# Patient Record
Sex: FEMALE | Race: ASIAN | Employment: FULL TIME | URBAN - METROPOLITAN AREA
[De-identification: names, ages, dates, MRNs, and addresses within clinical notes are randomized per-mention and may not be internally consistent; named-entity substitution may affect disease eponyms.]

---

## 2017-05-22 ENCOUNTER — HOSPITAL ENCOUNTER (OUTPATIENT)
Dept: MAMMOGRAPHY | Age: 62
Discharge: HOME | End: 2017-05-22
Attending: FAMILY MEDICINE
Payer: COMMERCIAL

## 2017-05-22 DIAGNOSIS — Z12.31 VISIT FOR SCREENING MAMMOGRAM: ICD-10-CM

## 2017-05-22 PROCEDURE — 77063 BREAST TOMOSYNTHESIS BI: CPT

## 2018-05-23 ENCOUNTER — HOSPITAL ENCOUNTER (OUTPATIENT)
Dept: MAMMOGRAPHY | Age: 63
Discharge: HOME | End: 2018-05-23
Attending: FAMILY MEDICINE
Payer: COMMERCIAL

## 2018-05-23 ENCOUNTER — HOSPITAL ENCOUNTER (OUTPATIENT)
Dept: GENERAL RADIOLOGY | Age: 63
Discharge: HOME | End: 2018-05-23
Attending: FAMILY MEDICINE
Payer: COMMERCIAL

## 2018-05-23 DIAGNOSIS — Z12.31 VISIT FOR SCREENING MAMMOGRAM: ICD-10-CM

## 2018-05-23 DIAGNOSIS — M81.0 OSTEOPOROSIS: ICD-10-CM

## 2018-05-23 PROCEDURE — 77080 DXA BONE DENSITY AXIAL: CPT

## 2018-05-23 PROCEDURE — 77063 BREAST TOMOSYNTHESIS BI: CPT

## 2019-06-04 ENCOUNTER — HOSPITAL ENCOUNTER (OUTPATIENT)
Dept: MAMMOGRAPHY | Age: 64
Discharge: HOME | End: 2019-06-04
Attending: OBSTETRICS & GYNECOLOGY
Payer: COMMERCIAL

## 2019-06-04 DIAGNOSIS — Z12.31 VISIT FOR SCREENING MAMMOGRAM: ICD-10-CM

## 2019-06-04 PROCEDURE — 77063 BREAST TOMOSYNTHESIS BI: CPT

## 2020-06-02 ENCOUNTER — HOSPITAL ENCOUNTER (OUTPATIENT)
Dept: GENERAL RADIOLOGY | Age: 65
Discharge: HOME | End: 2020-06-02
Attending: FAMILY MEDICINE
Payer: COMMERCIAL

## 2020-06-02 ENCOUNTER — HOSPITAL ENCOUNTER (OUTPATIENT)
Dept: MAMMOGRAPHY | Age: 65
Discharge: HOME | End: 2020-06-02
Attending: FAMILY MEDICINE
Payer: COMMERCIAL

## 2020-06-02 DIAGNOSIS — Z12.31 VISIT FOR SCREENING MAMMOGRAM: ICD-10-CM

## 2020-06-02 DIAGNOSIS — M81.0 SENILE OSTEOPOROSIS: ICD-10-CM

## 2020-06-02 PROCEDURE — 77080 DXA BONE DENSITY AXIAL: CPT

## 2020-06-02 PROCEDURE — 77063 BREAST TOMOSYNTHESIS BI: CPT

## 2021-06-04 ENCOUNTER — HOSPITAL ENCOUNTER (OUTPATIENT)
Dept: WOMENS IMAGING | Age: 66
Discharge: HOME | End: 2021-06-04
Payer: COMMERCIAL

## 2021-06-04 DIAGNOSIS — Z12.31 ENCOUNTER FOR SCREENING MAMMOGRAM FOR BREAST CANCER: ICD-10-CM

## 2021-06-04 PROCEDURE — 77063 BREAST TOMOSYNTHESIS BI: CPT

## 2022-05-23 ENCOUNTER — TRANSCRIBE ORDER (OUTPATIENT)
Dept: SCHEDULING | Age: 67
End: 2022-05-23

## 2022-05-23 DIAGNOSIS — M85.89 OTHER SPECIFIED DISORDERS OF BONE DENSITY AND STRUCTURE, MULTIPLE SITES: Primary | ICD-10-CM

## 2022-05-24 ENCOUNTER — TRANSCRIBE ORDER (OUTPATIENT)
Dept: SCHEDULING | Age: 67
End: 2022-05-24

## 2022-05-24 DIAGNOSIS — Z12.31 SCREENING MAMMOGRAM, ENCOUNTER FOR: Primary | ICD-10-CM

## 2022-07-14 ENCOUNTER — HOSPITAL ENCOUNTER (OUTPATIENT)
Dept: WOMENS IMAGING | Age: 67
Discharge: HOME | End: 2022-07-14
Attending: FAMILY MEDICINE
Payer: COMMERCIAL

## 2022-07-14 ENCOUNTER — HOSPITAL ENCOUNTER (OUTPATIENT)
Dept: BONE DENSITY | Age: 67
Discharge: HOME | End: 2022-07-14
Attending: FAMILY MEDICINE
Payer: COMMERCIAL

## 2022-07-14 DIAGNOSIS — M85.89 OTHER SPECIFIED DISORDERS OF BONE DENSITY AND STRUCTURE, MULTIPLE SITES: ICD-10-CM

## 2022-07-14 DIAGNOSIS — Z12.31 SCREENING MAMMOGRAM, ENCOUNTER FOR: ICD-10-CM

## 2022-07-14 PROCEDURE — 77080 DXA BONE DENSITY AXIAL: CPT

## 2022-07-14 PROCEDURE — 77063 BREAST TOMOSYNTHESIS BI: CPT

## 2024-06-10 ENCOUNTER — HOSPITAL ENCOUNTER (OUTPATIENT)
Facility: HOSPITAL | Age: 69
Setting detail: RECURRING SERIES
Discharge: HOME | End: 2024-06-13
Payer: MEDICARE

## 2024-06-10 PROCEDURE — 97112 NEUROMUSCULAR REEDUCATION: CPT

## 2024-06-10 PROCEDURE — 97161 PT EVAL LOW COMPLEX 20 MIN: CPT

## 2024-06-10 PROCEDURE — 97535 SELF CARE MNGMENT TRAINING: CPT

## 2024-06-10 NOTE — PROGRESS NOTES
JOHNATHAN NGUYEN [facility]  [address] [address] Phone: [phone] Fax   Plan of Care / Statement of Necessity for Physical Therapy Services     Patient Name: Steffany Payne : 1955   Medical   Diagnosis: Right knee pain [M25.561]  Left knee pain [M25.562] Treatment Diagnosis: Right knee pain [M25.561]  Left knee pain [M25.562]   Onset Date: 2024 Payor: Payor: MEDICARE / Plan: MEDICARE PART A AND B / Product Type: *No Product type* /    Referral Source: Jennifer Ahumada APRN* Start of Care (SOC): 6/10/2024   Prior Hospitalization: See medical history Provider #: [id number]   Prior Level of Function: Independent, active   Comorbidities: HTN   Medications: Verified on Patient Summary List     Assessment / key information:  70 yo female who presents to [facility] with c/o B knee pain. Patient's impairments include + right medial and lateral joint pain, + tenderness at right pes anserine, + MCL laxity and strain. Patient reports decreased PLOF including hip, knee and ankle weakness and poor balance, hypermobility throughout, decreased stability throughout. Patient demonstrates decreased ROM, decreased strength, impaired posture, impaired gait mechancis, pain and decreased functional mobility tolerance.     Patient will continue to benefit from skilled PT services to modify and progress therapeutic interventions, address functional mobility deficits, address ROM deficits, address strength deficits, analyze and address soft tissue restrictions, analyze and cue movement patterns, analyze and modify body mechanics/ergonomics, assess and modify postural abnormalities, address imbalance/dizziness, and instruct in home and community integration to attain remaining goals.    Evaluation Complexity:  History:  LOW Complexity : Zero comorbidities / personal factors that will impact the outcome / POC; Examination:  LOW Complexity : 1-2 
be accomplished in 4 weeks  Patient will be independent and compliant with HEP to progress toward goals and restore functional mobility.   Eval Status: issued at eval    Pt will have 5/5 bilateral knee extension strength to return to goals of stairs without pain.  Eval Status: 4/5 bilateral knee extension    Long Term Goals: To be accomplished in 8 weeks  Patient will improve LEFS score by 10 points in order to improve pain with stairs   Current: 65/80    2.   Pt will have 5/5 hip extension strength to return to goals of bending/lifting without pain.  Eval Status: 4/5 hip extension     PLAN  [x]  Upgrade activities as tolerated     [x]  Continue plan of care  [x]  Update interventions per flow sheet       []  Discharge due to:_  []  Other:_      Brianna Strange, PT 6/10/2024  2:06 PM

## 2024-06-13 ENCOUNTER — HOSPITAL ENCOUNTER (OUTPATIENT)
Facility: HOSPITAL | Age: 69
Setting detail: RECURRING SERIES
Discharge: HOME | End: 2024-06-16
Payer: MEDICARE

## 2024-06-13 PROCEDURE — 97140 MANUAL THERAPY 1/> REGIONS: CPT

## 2024-06-13 PROCEDURE — 97530 THERAPEUTIC ACTIVITIES: CPT

## 2024-06-13 PROCEDURE — 97112 NEUROMUSCULAR REEDUCATION: CPT

## 2024-06-13 NOTE — PROGRESS NOTES
PHYSICAL / OCCUPATIONAL THERAPY - DAILY TREATMENT NOTE (updated )    Patient Name: Steffany Payne    Date: 2024    : 1955  Insurance: Payor: MEDICARE / Plan: MEDICARE PART A AND B / Product Type: *No Product type* /      Patient  verified Yes     Visit #   Current / Total 2 16   Time   In / Out 4 4:40   Pain   In / Out 2 0   Subjective Functional Status/Changes: Pt reports that the exercises are helping her feel better.      TREATMENT AREA =  Right knee pain [M25.561]  Left knee pain [M25.562]    OBJECTIVE         Therapeutic Procedures:    Tx Min Billable or 1:1 Min (if diff from Tx Min) Procedure, Rationale, Specifics   12  79158 Therapeutic Activity (timed):  use of dynamic activities replicating functional movements to increase ROM, strength, coordination, balance, and proprioception in order to improve patient's ability to progress to PLOF and address remaining functional goals.  (see flow sheet as applicable)     Details if applicable:       25  74105 Neuromuscular Re-Education (timed):  improve balance, coordination, kinesthetic sense, posture, core stability and proprioception to improve patient's ability to develop conscious control of individual muscles and awareness of position of extremities in order to progress to PLOF and address remaining functional goals. (see flow sheet as applicable)     Details if applicable:     8  97166 Manual Therapy (timed):  increase ROM, increase tissue extensibility, decrease trigger points, and improve alignment to improve patient's ability to progress to PLOF and address remaining functional goals.  The manual therapy interventions were performed at a separate and distinct time from the therapeutic activities interventions . (see flow sheet as applicable)     Details if applicable:  R peroneal STM, tibial IR MWM with VMP activation into knee ext          Details if applicable:            Details if applicable:     40  MC BC Totals Reminder: bill using

## 2024-06-18 ENCOUNTER — HOSPITAL ENCOUNTER (OUTPATIENT)
Facility: HOSPITAL | Age: 69
Setting detail: RECURRING SERIES
Discharge: HOME | End: 2024-06-21
Payer: MEDICARE

## 2024-06-18 PROCEDURE — 97530 THERAPEUTIC ACTIVITIES: CPT

## 2024-06-18 PROCEDURE — 97110 THERAPEUTIC EXERCISES: CPT

## 2024-06-18 PROCEDURE — 97112 NEUROMUSCULAR REEDUCATION: CPT

## 2024-06-18 NOTE — PROGRESS NOTES
PHYSICAL / OCCUPATIONAL THERAPY - DAILY TREATMENT NOTE (updated )    Patient Name: Steffany Payne    Date: 2024    : 1955  Insurance: Payor: MEDICARE / Plan: MEDICARE PART A AND B / Product Type: *No Product type* /      Patient  verified yes     Visit #   Current / Total 3 16   Time   In / Out 350 438   Pain   In / Out 2 0   Subjective Functional Status/Changes: On Saturday I did too much. I did some dancing and my      TREATMENT AREA =  Right knee pain [M25.561]  Left knee pain [M25.562]    Next PN/ RC due 7/10/24  Auth due med nec    OBJECTIVE    Therapeutic Procedures:  Tx Min Procedure, Rationale, Specifics   15 97298 Therapeutic Exercise (timed):  increase ROM, strength, coordination, balance, and proprioception to improve patient's ability to progress to PLOF and address remaining functional goals. (see flow sheet as applicable)     Details if applicable:       15 97423 Therapeutic Activity (timed):  use of dynamic activities replicating functional movements to increase ROM, strength, coordination, balance, and proprioception in order to improve patient's ability to progress to PLOF and address remaining functional goals.  (see flow sheet as applicable)     Details if applicable:     8 69932 Self Care/Home Management (timed):  improve patient knowledge and understanding of activity modification  to improve patient's ability to progress to PLOF and address remaining functional goals.  (see flow sheet as applicable)     Details if applicable:  Discussed and educated patient re: biomechanics in relation to recent activities done outside of the clinic vs her CC. Educated patient re: icing, elevation, and AROM to ankle     38 MC BC Totals Reminder: bill using total billable min of TIMED therapeutic procedures (example: do not include dry needle or estim unattended, both untimed codes, in totals to left)  8-22 min = 1 unit; 23-37 min = 2 units; 38-52 min = 3 units; 53-67 min = 4 units; 68-82 
Normal for race

## 2024-06-20 ENCOUNTER — HOSPITAL ENCOUNTER (OUTPATIENT)
Facility: HOSPITAL | Age: 69
Setting detail: RECURRING SERIES
Discharge: HOME | End: 2024-06-23
Payer: MEDICARE

## 2024-06-20 PROCEDURE — 97112 NEUROMUSCULAR REEDUCATION: CPT

## 2024-06-20 PROCEDURE — 97110 THERAPEUTIC EXERCISES: CPT

## 2024-06-20 PROCEDURE — 97530 THERAPEUTIC ACTIVITIES: CPT

## 2024-06-20 NOTE — PROGRESS NOTES
PHYSICAL / OCCUPATIONAL THERAPY - DAILY TREATMENT NOTE (updated )    Patient Name: Steffany Payne    Date: 2024    : 1955  Insurance: Payor: MEDICARE / Plan: MEDICARE PART A AND B / Product Type: *No Product type* /      Patient  verified yes     Visit #   Current / Total 4 16   Time   In / Out 4 4:50   Pain   In / Out 0 0   Subjective Functional Status/Changes: Pt reports she hurt her ankle dancing this weekend but it's slowly feeling better. She has no knee pain today.       TREATMENT AREA =  Right knee pain [M25.561]  Left knee pain [M25.562]    Next PN/ RC due 7/10/24  Auth due med nec    OBJECTIVE    Therapeutic Procedures:  Tx Min Procedure, Rationale, Specifics   15 09216 Therapeutic Exercise (timed):  increase ROM, strength, coordination, balance, and proprioception to improve patient's ability to progress to PLOF and address remaining functional goals. (see flow sheet as applicable)     Details if applicable:       15 54790 Neuromuscular Re-Education (timed):  improve balance, coordination, kinesthetic sense, posture, core stability and proprioception to improve patient's ability to develop conscious control of individual muscles and awareness of position of extremities in order to progress to PLOF and address remaining functional goals. (see flow sheet as applicable)      Details if applicable:     10 78488 Therapeutic Activity (timed):  use of dynamic activities replicating functional movements to increase ROM, strength, coordination, balance, and proprioception in order to improve patient's ability to progress to PLOF and address remaining functional goals.  (see flow sheet as applicable)      Details if applicable:       40 MC BC Totals Reminder: bill using total billable min of TIMED therapeutic procedures (example: do not include dry needle or estim unattended, both untimed codes, in totals to left)  8-22 min = 1 unit; 23-37 min = 2 units; 38-52 min = 3 units; 53-67 min = 4 units;

## 2024-06-25 ENCOUNTER — HOSPITAL ENCOUNTER (OUTPATIENT)
Facility: HOSPITAL | Age: 69
Setting detail: RECURRING SERIES
Discharge: HOME | End: 2024-06-28
Payer: MEDICARE

## 2024-06-25 PROCEDURE — 97530 THERAPEUTIC ACTIVITIES: CPT

## 2024-06-25 PROCEDURE — 97110 THERAPEUTIC EXERCISES: CPT

## 2024-06-25 PROCEDURE — 97112 NEUROMUSCULAR REEDUCATION: CPT

## 2024-06-27 ENCOUNTER — HOSPITAL ENCOUNTER (OUTPATIENT)
Facility: HOSPITAL | Age: 69
Setting detail: RECURRING SERIES
Discharge: HOME | End: 2024-06-30
Payer: MEDICARE

## 2024-06-27 PROCEDURE — 97164 PT RE-EVAL EST PLAN CARE: CPT

## 2024-06-27 PROCEDURE — 97530 THERAPEUTIC ACTIVITIES: CPT

## 2024-06-27 PROCEDURE — 97112 NEUROMUSCULAR REEDUCATION: CPT

## 2024-06-27 NOTE — PROGRESS NOTES
as tolerated  []  Discharge due to :  []  Other:    Justyna Carrillo PTA    6/27/2024    2:20 PM    Future Appointments   Date Time Provider Department Center   7/2/2024  4:00 PM Cintia Gu, PT [facility]   7/5/2024 10:00 AM Cintia Gu, PT [facility]   7/9/2024 10:00 AM Justyna Carrillo, AN[facility]   7/11/2024 10:00 AM Roxanna Cerda, PT [facility]   7/16/2024 10:00 AM Cintia Gu, PT [facility]   7/18/2024 10:00 AM Brianna Strange, PT [facility]

## 2024-07-02 ENCOUNTER — HOSPITAL ENCOUNTER (OUTPATIENT)
Facility: HOSPITAL | Age: 69
Setting detail: RECURRING SERIES
Discharge: HOME | End: 2024-07-05
Payer: MEDICARE

## 2024-07-02 PROCEDURE — 97530 THERAPEUTIC ACTIVITIES: CPT

## 2024-07-02 PROCEDURE — 97112 NEUROMUSCULAR REEDUCATION: CPT

## 2024-07-02 PROCEDURE — 97110 THERAPEUTIC EXERCISES: CPT

## 2024-07-02 NOTE — PROGRESS NOTES
remaining functional goals. (see flow sheet as applicable)     Details if applicable:  stand TKE, S/L clam   50  MC BC Totals Reminder: bill using total billable min of TIMED therapeutic procedures (example: do not include dry needle or estim unattended, both untimed codes, in totals to left)  8-22 min = 1 unit; 23-37 min = 2 units; 38-52 min = 3 units; 53-67 min = 4 units; 68-82 min = 5 units   Total Total     [x]  Patient Education billed concurrently with other procedures   [x] Review HEP    [] Progressed/Changed HEP, detail:    [] Other detail:       Objective Information/Functional Measures/Assessment    Trial of ambulation on treadmill but noted fairly significant lack of TKE on right LE with a lack of heel strike. Patient unable to correct with verbal cues; therefore, held ambulation on treadmill.    Noted anterior translation of B knees with sit to stand transfer; therefore, placed chair in front of knees and elevated mat height. Patient then able to perform minisquat without anterior translation.     Exercise progression per flowsheet.    Patient will continue to benefit from skilled PT / OT services to modify and progress therapeutic interventions, analyze and address functional mobility deficits, analyze and address ROM deficits, analyze and address strength deficits, analyze and address soft tissue restrictions, analyze and cue for proper movement patterns, analyze and modify for postural abnormalities, and instruct in home and community integration to address functional deficits and attain remaining goals.    Progress toward goals / Updated goals:  []  See Progress Note/Recertification    Short Term Goals: To be accomplished in 4 weeks  Patient will be independent and compliant with HEP to progress toward goals and restore functional mobility.   Eval Status: issued at [facility]  Current status: compliant with HEP     Pt will have 5/5 bilateral knee extension strength to return to goals of stairs without

## 2024-07-05 ENCOUNTER — HOSPITAL ENCOUNTER (OUTPATIENT)
Facility: HOSPITAL | Age: 69
Setting detail: RECURRING SERIES
Discharge: HOME | End: 2024-07-08
Payer: MEDICARE

## 2024-07-05 PROCEDURE — 97530 THERAPEUTIC ACTIVITIES: CPT

## 2024-07-05 PROCEDURE — 97112 NEUROMUSCULAR REEDUCATION: CPT

## 2024-07-05 PROCEDURE — 97110 THERAPEUTIC EXERCISES: CPT

## 2024-07-05 NOTE — PROGRESS NOTES
PHYSICAL THERAPY - DAILY TREATMENT NOTE (updated )    Patient Name: Steffany Payne    Date: 2024    : 1955  Insurance: Payor: MEDICARE / Plan: MEDICARE PART A AND B / Product Type: *No Product type* /      Patient  verified yes     Visit #   Current / Total 8 16   Time   In / Out 10:00 10:52   Pain   In / Out 0/10 B knees  2/10 left ankle 0/10   Subjective Functional Status/Changes: Reports she stepped on a slipper this morning and \"rolled my left ankle.\" States she fell on her knees. States she was able to get up. She reports pain in left ankle today at 210, no pain in B knees.     TREATMENT AREA =  Right knee pain [M25.561]  Left knee pain [M25.562]    Next PN/ RC due 7/10/24  Auth due FRANK  30V    OBJECTIVE    Ice (UNBILLED):  location/position: CP to left ankle in supported H/L     Min Rationale   10 decrease edema, decrease inflammation, and decrease pain to improve patient's ability to progress to PLOF and address remaining functional goals.     Skin assessment post-treatment:   Intact     Therapeutic Procedures:  Tx Min Billable or 1:1 Min (if diff from Tx Min) Procedure, Rationale, Specifics   19  27229 Therapeutic Exercise (timed):  increase ROM, strength, coordination, balance, and proprioception to improve patient's ability to progress to PLOF and address remaining functional goals. (see flow sheet as applicable)     Details if applicable:  NuStep, seated knee flexion, SLR, prone hip extension     8  41856 Therapeutic Activity (timed):  use of dynamic activities replicating functional movements to increase ROM, strength, coordination, balance, and proprioception in order to improve patient's ability to progress to PLOF and address remaining functional goals.  (see flow sheet as applicable)     Details if applicable:  bridge   15  44706 Neuromuscular Re-Education (timed):  improve balance, coordination, kinesthetic sense, posture, core stability and proprioception to improve patient's

## 2024-07-09 ENCOUNTER — HOSPITAL ENCOUNTER (OUTPATIENT)
Facility: HOSPITAL | Age: 69
Setting detail: RECURRING SERIES
Discharge: HOME | End: 2024-07-12
Payer: MEDICARE

## 2024-07-09 PROCEDURE — 97110 THERAPEUTIC EXERCISES: CPT

## 2024-07-09 PROCEDURE — 97112 NEUROMUSCULAR REEDUCATION: CPT

## 2024-07-09 PROCEDURE — 97530 THERAPEUTIC ACTIVITIES: CPT

## 2024-07-09 NOTE — PROGRESS NOTES
PHYSICAL  DAILY TREATMENT NOTE     Patient Name: Steffany Payne    Date: 2024    : 1955  Insurance: Payor: MEDICARE / Plan: MEDICARE PART A AND B / Product Type: *No Product type* /      Patient  verified Yes     Visit #   Current / Total 9 16   Time   In / Out 1002 1101   Pain   In / Out 2/10 R knee  4/10 L ankle 2/10   Subjective Functional Status/Changes: \"I start back to work . My ankle just been swelling some and hurting now sure what I did\"       Next PN/ RC due 7/10/24  Auth due FRANK  30V    TREATMENT AREA =  Right knee pain [M25.561]  Left knee pain [M25.562]    OBJECTIVE      Modalities Rationale:     decrease inflammation and decrease pain to improve patient's ability to progress to PLOF and address remaining functional goals.    10 min  unbill [x]  Ice     []  Heat    location/position: Supine with wedge under B Les     Skin assessment post-treatment:   Intact         Therapeutic Procedures:    Tx Min Billable or 1:1 Min (if diff from Tx Min) Procedure, Rationale, Specifics   29  32020 Therapeutic Exercise (timed):  increase ROM, strength, coordination, balance, and proprioception to improve patient's ability to progress to PLOF and address remaining functional goals. (see flow sheet as applicable)     Details if applicable:       10  85400 Neuromuscular Re-Education (timed):  improve balance, coordination, kinesthetic sense, posture, core stability and proprioception to improve patient's ability to develop conscious control of individual muscles and awareness of position of extremities in order to progress to PLOF and address remaining functional goals. (see flow sheet as applicable)     Details if applicable:     10  62742 Therapeutic Activity (timed):  use of dynamic activities replicating functional movements to increase ROM, strength, coordination, balance, and proprioception in order to improve patient's ability to progress to PLOF and address remaining functional goals.  (see

## 2024-07-11 ENCOUNTER — HOSPITAL ENCOUNTER (OUTPATIENT)
Facility: HOSPITAL | Age: 69
Setting detail: RECURRING SERIES
Discharge: HOME | End: 2024-07-14
Payer: MEDICARE

## 2024-07-11 PROCEDURE — 97110 THERAPEUTIC EXERCISES: CPT

## 2024-07-11 PROCEDURE — 97530 THERAPEUTIC ACTIVITIES: CPT

## 2024-07-11 PROCEDURE — 97112 NEUROMUSCULAR REEDUCATION: CPT

## 2024-07-11 NOTE — PROGRESS NOTES
[facility] AT [facility]  [address] [place] [address]   Phone: [phone] Fax: [phone]  PROGRESS NOTE  Patient Name: Steffany Payne : 1955   Treatment/Medical Diagnosis: M25.561  RIGHT KNEE PAIN and M25.562  LEFT KNEE PAIN      Referral Source: Jennifer Ahumada, APRN*     Date of Initial Visit: 6/10/2024 Attended Visits: 10 Missed Visits: 0     SUMMARY OF TREATMENT  Treatment has consisted of initial evaluation and 9 treatment sessions, which have included ROM/stretching/strengthening exercises, modalities for pain relief and patient education (including HEP).  CURRENT STATUS  Patient has progressed well with exercises in clinic and reports compliance with HEP.  Patient denies knee pain this session, but reports 2/10 left ankle pain, which she attributes to spraining left ankle on 2024 when she stepped on a slipper that was on her floor.    Strength (MMT):                                            Hip Left (1-5) Right (1-5)   Hip Flexion 4 4   Hip Extension 4- 3   Hip ABD 5 5   Hip ADD NT NT   Hip ER 4 4   Hip IR 5 5      Knee Left (1-5) Right (1-5)   Knee Flexion 5 5   Knee Extension 5 5   Ankle PF 4 4    Ankle DF  5 5    Other          LEFS: 52/30 (decreased from 65/80)  (Reviewed answers with patient as score has decreased from initial evaluation despite patient subjective reports of improvement; after reviewing answers, patient reports that she is more aware of her deficits and now realizes that many of her answers from initial LEFS were inaccurate)    Patient states goals as walking longer distances, increased ease negotiating stairs and increased ease with squatting.    Current Goals:  Short Term Goals: To be accomplished in 4 weeks  Patient will be independent and compliant with HEP to progress toward goals and restore functional mobility.   [facility] Status: issued at [facility]  Current Status: compliant with HEP, progressing     Pt 
(timed):  increase ROM, strength, coordination, balance, and proprioception to improve patient's ability to progress to PLOF and address remaining functional goals. (see flow sheet as applicable)     Details if applicable:  HR/TR, incline stretch, LAQ, prone knee flex, MMT     21  03569 Neuromuscular Re-Education (timed):  improve balance, coordination, kinesthetic sense, posture, core stability and proprioception to improve patient's ability to develop conscious control of individual muscles and awareness of position of extremities in order to progress to PLOF and address remaining functional goals. (see flow sheet as applicable)     Details if applicable:  Stand hip 3-way, SLR 3-way   8  60433 Therapeutic Activity (timed):  use of dynamic activities replicating functional movements to increase ROM, strength, coordination, balance, and proprioception in order to improve patient's ability to progress to PLOF and address remaining functional goals.  (see flow sheet as applicable)     Details if applicable:  LEFS             51  MC BC Totals Reminder: bill using total billable min of TIMED therapeutic procedures (example: do not include dry needle or estim unattended, both untimed codes, in totals to left)  8-22 min = 1 unit; 23-37 min = 2 units; 38-52 min = 3 units; 53-67 min = 4 units; 68-82 min = 5 units   Total Total     [x]  Patient Education billed concurrently with other procedures   [x] Review HEP    [] Progressed/Changed HEP, detail:    [] Other detail:       Objective Information/Functional Measures/Assessment    Exercises per flow sheet    Strength (MMT):                                            Hip Left (1-5) Right (1-5)   Hip Flexion 4 4   Hip Extension 4- 3   Hip ABD 5 5   Hip ADD NT NT   Hip ER 4 4   Hip IR 5 5      Knee Left (1-5) Right (1-5)   Knee Flexion 5 5   Knee Extension 5 5   Ankle PF 4 4    Ankle DF  5 5    Other          LEFS: 52/30 (decreased from 65/80)  (Reviewed answers with patient as

## 2024-07-16 ENCOUNTER — HOSPITAL ENCOUNTER (OUTPATIENT)
Facility: HOSPITAL | Age: 69
Setting detail: RECURRING SERIES
Discharge: HOME | End: 2024-07-19
Payer: MEDICARE

## 2024-07-16 PROCEDURE — 97530 THERAPEUTIC ACTIVITIES: CPT

## 2024-07-16 PROCEDURE — 97110 THERAPEUTIC EXERCISES: CPT

## 2024-07-16 PROCEDURE — 97112 NEUROMUSCULAR REEDUCATION: CPT

## 2024-07-16 NOTE — PROGRESS NOTES
PHYSICAL THERAPY - DAILY TREATMENT NOTE (updated )    Patient Name: Steffany Payne    Date: 2024    : 1955  Insurance: Payor: MEDICARE / Plan: MEDICARE PART A AND B / Product Type: *No Product type* /      Patient  verified yes     Visit #   Current / Total 11 16   Time   In / Out 10:00 10:43   Pain   In / Out 0/10 0/10   Subjective Functional Status/Changes: Patient denies pain in knees and left ankle.  Reports continued compliance with HEP.       TREATMENT AREA =  Right knee pain [M25.561]  Left knee pain [M25.562]    Next PN due: 2024, RC: 9/10/2024   Auth due: 30 visits, 2024     OBJECTIVE    Ice (UNBILLED):  location/position: CP to left ankle in supported H/L     Min Rationale   10 decrease edema, decrease inflammation, and decrease pain to improve patient's ability to progress to PLOF and address remaining functional goals.     Skin assessment post-treatment:   Intact     Therapeutic Procedures:  Tx Min Billable or 1:1 Min (if diff from Tx Min) Procedure, Rationale, Specifics   15  81624 Therapeutic Exercise (timed):  increase ROM, strength, coordination, balance, and proprioception to improve patient's ability to progress to PLOF and address remaining functional goals. (see flow sheet as applicable)     Details if applicable:  NuStep, stand HR/TR, incline stretch, stand knee flexion     13  59248 Therapeutic Activity (timed):  use of dynamic activities replicating functional movements to increase ROM, strength, coordination, balance, and proprioception in order to improve patient's ability to progress to PLOF and address remaining functional goals.  (see flow sheet as applicable)     Details if applicable:  minisquats, step ups, bridge   15  67074 Neuromuscular Re-Education (timed):  improve balance, coordination, kinesthetic sense, posture, core stability and proprioception to improve patient's ability to develop conscious control of individual muscles and awareness of

## 2024-07-18 ENCOUNTER — HOSPITAL ENCOUNTER (OUTPATIENT)
Facility: HOSPITAL | Age: 69
Setting detail: RECURRING SERIES
Discharge: HOME | End: 2024-07-21
Payer: MEDICARE

## 2024-07-18 PROCEDURE — 97112 NEUROMUSCULAR REEDUCATION: CPT

## 2024-07-18 PROCEDURE — 97110 THERAPEUTIC EXERCISES: CPT

## 2024-07-18 PROCEDURE — 97530 THERAPEUTIC ACTIVITIES: CPT

## 2024-07-18 NOTE — PROGRESS NOTES
PHYSICAL THERAPY - DAILY TREATMENT NOTE (updated )    Patient Name: Steffany Payne    Date: 2024    : 1955  Insurance: Payor: MEDICARE / Plan: MEDICARE PART A AND B / Product Type: *No Product type* /      Patient  verified yes     Visit #   Current / Total 12 16   Time   In / Out 10:00 10:53   Pain   In / Out 2/10 0/10   Subjective Functional Status/Changes: Patient reports her right knee hurts today and her ankle hurt last night       TREATMENT AREA =  Right knee pain [M25.561]  Left knee pain [M25.562]    Next PN due: 2024, RC: 9/10/2024   Auth due: 30 visits, 2024     OBJECTIVE    Therapeutic Procedures:  Tx Min Billable or 1:1 Min (if diff from Tx Min) Procedure, Rationale, Specifics   23  48987 Therapeutic Exercise (timed):  increase ROM, strength, coordination, balance, and proprioception to improve patient's ability to progress to PLOF and address remaining functional goals. (see flow sheet as applicable)     Details if applicable:  NuStep, stand HR/TR, incline stretch, stand knee flexion     20  35817 Therapeutic Activity (timed):  use of dynamic activities replicating functional movements to increase ROM, strength, coordination, balance, and proprioception in order to improve patient's ability to progress to PLOF and address remaining functional goals.  (see flow sheet as applicable)     Details if applicable:  minisquats, step ups, bridge, lateral step ups   10  41065 Neuromuscular Re-Education (timed):  improve balance, coordination, kinesthetic sense, posture, core stability and proprioception to improve patient's ability to develop conscious control of individual muscles and awareness of position of extremities in order to progress to PLOF and address remaining functional goals. (see flow sheet as applicable)     Details if applicable:  standing hip 3 way, LAQ, S/L clam   553  [facility] Totals Reminder: bill using total billable min of TIMED therapeutic procedures (example:

## 2024-07-23 ENCOUNTER — HOSPITAL ENCOUNTER (OUTPATIENT)
Facility: HOSPITAL | Age: 69
Setting detail: RECURRING SERIES
Discharge: HOME | End: 2024-07-26
Payer: MEDICARE

## 2024-07-23 PROCEDURE — 97530 THERAPEUTIC ACTIVITIES: CPT

## 2024-07-23 PROCEDURE — 97112 NEUROMUSCULAR REEDUCATION: CPT

## 2024-07-23 PROCEDURE — 97110 THERAPEUTIC EXERCISES: CPT

## 2024-07-23 NOTE — PROGRESS NOTES
PHYSICAL / OCCUPATIONAL THERAPY - DAILY TREATMENT NOTE (updated )    Patient Name: Steffany Payne    Date: 2024    : 1955  Insurance: Payor: MEDICARE / Plan: MEDICARE PART A AND B / Product Type: *No Product type* /      Patient  verified Yes     Visit #   Current / Total 13 16   Time   In / Out 3:57 4:41   Pain   In / Out 1 1   Subjective Functional Status/Changes: Pt reports 1/10 right knee and left ankle pain.     Next PN/ RC due PN 2024, RC 9/10/2024  Auth due 30 visits, 2024    TREATMENT AREA =  Right knee pain [M25.561]  Left knee pain [M25.562]    OBJECTIVE    Therapeutic Procedures:    Tx Min Billable or 1:1 Min (if diff from Tx Min) Procedure, Rationale, Specifics   12  05408 Therapeutic Exercise (timed):  increase ROM, strength, coordination, balance, and proprioception to improve patient's ability to progress to PLOF and address remaining functional goals. (see flow sheet as applicable)     Details if applicable:  HR/TR, LAQ/stand knee flex     12  51737 Neuromuscular Re-Education (timed):  improve balance, coordination, kinesthetic sense, posture, core stability and proprioception to improve patient's ability to develop conscious control of individual muscles and awareness of position of extremities in order to progress to PLOF and address remaining functional goals. (see flow sheet as applicable)     Details if applicable:  Stand hip 3-way   20  86885 Therapeutic Activity (timed):  use of dynamic activities replicating functional movements to increase ROM, strength, coordination, balance, and proprioception in order to improve patient's ability to progress to PLOF and address remaining functional goals.  (see flow sheet as applicable)     Details if applicable:  Nustep, mini-squats, step-ups,  mini-squats, sit to stand, HK/SS/retro amb             44  [facility] Totals Reminder: bill using total billable min of TIMED therapeutic procedures (example: do not include dry needle

## 2024-07-26 ENCOUNTER — HOSPITAL ENCOUNTER (OUTPATIENT)
Facility: HOSPITAL | Age: 69
Setting detail: RECURRING SERIES
Discharge: HOME | End: 2024-07-29
Payer: MEDICARE

## 2024-07-26 PROCEDURE — 97110 THERAPEUTIC EXERCISES: CPT

## 2024-07-26 PROCEDURE — 97112 NEUROMUSCULAR REEDUCATION: CPT

## 2024-07-26 PROCEDURE — 97530 THERAPEUTIC ACTIVITIES: CPT

## 2024-07-26 NOTE — PROGRESS NOTES
PHYSICAL  DAILY TREATMENT NOTE     Patient Name: Steffany Payne    Date: 2024    : 1955  Insurance: Payor: MEDICARE / Plan: MEDICARE PART A AND B / Product Type: *No Product type* /      Patient  verified Yes     Visit #   Current / Total 14 16   Time   In / Out 1040 1134   Pain   In / Out 2/10 R knee  0/10 L ankle 0/10   Subjective Functional Status/Changes: \"I can move faster so that is good. After I move too much, all day, I feel it in the knees and ankle\"       Next PN/ RC due 7/10/24  Auth due FRANK  30V    TREATMENT AREA =  Right knee pain [M25.561]  Left knee pain [M25.562]    OBJECTIVE        Therapeutic Procedures:    Tx Min Billable or 1:1 Min (if diff from Tx Min) Procedure, Rationale, Specifics   24  25029 Therapeutic Exercise (timed):  increase ROM, strength, coordination, balance, and proprioception to improve patient's ability to progress to PLOF and address remaining functional goals. (see flow sheet as applicable)     Details if applicable:       10  30393 Neuromuscular Re-Education (timed):  improve balance, coordination, kinesthetic sense, posture, core stability and proprioception to improve patient's ability to develop conscious control of individual muscles and awareness of position of extremities in order to progress to PLOF and address remaining functional goals. (see flow sheet as applicable)     Details if applicable:     20  59426 Therapeutic Activity (timed):  use of dynamic activities replicating functional movements to increase ROM, strength, coordination, balance, and proprioception in order to improve patient's ability to progress to PLOF and address remaining functional goals.  (see flow sheet as applicable)     Details if applicable:     54 54 [facility] Totals Reminder: bill using total billable min of TIMED therapeutic procedures (example: do not include dry needle or estim unattended, both untimed codes, in totals to left)  8-22 min = 1 unit; 23-37 min = 2 units; 38-52

## 2024-07-30 ENCOUNTER — APPOINTMENT (OUTPATIENT)
Facility: HOSPITAL | Age: 69
End: 2024-07-30
Payer: MEDICARE

## 2024-08-01 ENCOUNTER — APPOINTMENT (OUTPATIENT)
Facility: HOSPITAL | Age: 69
End: 2024-08-01
Payer: MEDICARE

## 2024-08-05 ENCOUNTER — APPOINTMENT (OUTPATIENT)
Facility: HOSPITAL | Age: 69
End: 2024-08-05
Payer: MEDICARE

## 2024-08-07 ENCOUNTER — APPOINTMENT (OUTPATIENT)
Facility: HOSPITAL | Age: 69
End: 2024-08-07
Payer: MEDICARE

## 2024-08-14 ENCOUNTER — HOSPITAL ENCOUNTER (OUTPATIENT)
Facility: HOSPITAL | Age: 69
Setting detail: RECURRING SERIES
Discharge: HOME | End: 2024-08-17
Payer: MEDICARE

## 2024-08-14 PROCEDURE — 97110 THERAPEUTIC EXERCISES: CPT

## 2024-08-14 PROCEDURE — 97530 THERAPEUTIC ACTIVITIES: CPT

## 2024-08-14 PROCEDURE — 97112 NEUROMUSCULAR REEDUCATION: CPT

## 2024-08-14 NOTE — PROGRESS NOTES
PHYSICAL  DAILY TREATMENT NOTE     Patient Name: Steffany Payne    Date: 2024    : 1955  Insurance: Payor: MEDICARE / Plan: MEDICARE PART A AND B / Product Type: *No Product type* /      Patient  verified Yes     Visit #   Current / Total 15 16   Time   In / Out 320 400   Pain   In / Out 2/10 R knee  0/10 L ankle 0/10   Subjective Functional Status/Changes: \"See PN    Pt stated she has a heart monitor on for high BP, and lower HR     Pt requested to d/c NV due to cardiac issues/tests       Next PN/ RC due 24  Auth due FRANK  30V    TREATMENT AREA =  Right knee pain [M25.561]  Left knee pain [M25.562]    OBJECTIVE        Therapeutic Procedures:    Tx Min Billable or 1:1 Min (if diff from Tx Min) Procedure, Rationale, Specifics   10  60625 Therapeutic Exercise (timed):  increase ROM, strength, coordination, balance, and proprioception to improve patient's ability to progress to PLOF and address remaining functional goals. (see flow sheet as applicable)     Details if applicable:       10  07733 Neuromuscular Re-Education (timed):  improve balance, coordination, kinesthetic sense, posture, core stability and proprioception to improve patient's ability to develop conscious control of individual muscles and awareness of position of extremities in order to progress to PLOF and address remaining functional goals. (see flow sheet as applicable)     Details if applicable:     20  71155 Therapeutic Activity (timed):  use of dynamic activities replicating functional movements to increase ROM, strength, coordination, balance, and proprioception in order to improve patient's ability to progress to PLOF and address remaining functional goals.  (see flow sheet as applicable)     Details if applicable:     40 40 [facility] Totals Reminder: bill using total billable min of TIMED therapeutic procedures (example: do not include dry needle or estim unattended, both untimed codes, in totals to left)  8-22 min = 1 unit;

## 2024-08-14 NOTE — PROGRESS NOTES
[facility]  [address] [place] [address]   Phone: [phone] Fax: [phone]  PROGRESS NOTE  Patient Name: Steffany Payne : 1955   Treatment/Medical Diagnosis: M25.561  RIGHT KNEE PAIN and M25.562  LEFT KNEE PAIN      Referral Source: Jennifer Ahumada APRN*     Date of Initial Visit: 6/10/2024 Attended Visits: 15 Missed Visits: 2     SUMMARY OF TREATMENT  Treatment has consisted of initial evaluation and 9 treatment sessions, which have included ROM/stretching/strengthening exercises, modalities for pain relief and patient education (including HEP).  CURRENT STATUS  Steffany continues to progress towards goals in PT by progressing towards (I) with HEP, increasing Strength and improving functional mobility. Pt reports she is able to ambulate 1.6 miles without limitations. Pt is able to (I) negotiating up and down 4 steps x 3  with no HandRails safely with reciprocal pattern. Pt increased hip and PF strength (*see below*). Pt requested to be d/c NV due to wanting to focus on new current cardiac issues/testing.    Strength (MMT):                                          Current                     24  Hip Left   (1-5) Right (1-5) Left (1-5) Right (1-5)   Hip Flexion 5 5 4 4   Hip Extension 4 4- 4- 3   Hip ABD 5 5 5 5   Hip ADD 4 4 NT NT   Hip ER 4+ 4+ 4 4   Hip IR 5 5 5 5                                               Current                24     Knee Left (1-5) Right (1-5) Left (1-5) Right (1-5)   Knee Flexion 5 5 5 5   Knee Extension 5 5 5 5   Ankle PF 5 5 4 4    Ankle DF 5 5  5 5    Other            Current Goals:    Long Term Goals: To be accomplished in 8 weeks  Patient will improve LEFS score by 10 points in order to improve pain with stairs. NT        24: 52/80     2.   Pt will have 5/5 hip extension strength to return to goals of bending/lifting without pain.  Progressing  Eval Status: 3+/5 left, 3/5 right

## 2024-08-22 ENCOUNTER — HOSPITAL ENCOUNTER (OUTPATIENT)
Facility: HOSPITAL | Age: 69
Setting detail: RECURRING SERIES
Discharge: HOME | End: 2024-08-25
Payer: MEDICARE

## 2024-08-22 PROCEDURE — 97112 NEUROMUSCULAR REEDUCATION: CPT

## 2024-08-22 PROCEDURE — 97530 THERAPEUTIC ACTIVITIES: CPT

## 2024-08-22 NOTE — THERAPY DISCHARGE
[facility] - [facility] AT [facility]  [address] [place] [address]  Phone: [phone] Fax [phone]  DISCHARGE SUMMARY  Patient Name: Steffany Payne : 1955   Treatment/Medical Diagnosis: M25.561  RIGHT KNEE PAIN and M25.562  LEFT KNEE PAIN    Referral Source: Jennifer Ahumada, APRN*     Date of Initial Visit: 6/10/2024 Attended Visits: 16 Missed Visits: 2     SUMMARY OF TREATMENT  Treatment has consisted of initial evaluation and 15 treatment sessions, which have included ROM/stretching/strengthening exercises, modalities for pain relief and patient education (including HEP).    CURRENT STATUS  Patient has progressed well with exercises in clinic and demonstrates independence with HEP.  Patient did have break in treatment (was not seen in clinic from 2024-2024) due to cardiac concerns.  Patient is requesting DC from PT at this time as she continues to undergo cardiac testing.  Patient has been advised to follow any activity restrictions provided by cardiology and stop exercise and seek medical attention if she experiences any cardiac symptoms.    LEFS: 74/80     GOAL/MEASURE OF PROGRESS  Short Term Goals: To be accomplished in 4 weeks  Patient will be independent and compliant with HEP to progress toward goals and restore functional mobility.   Eval Status: issued at eval  Current Status: independent with HEP, MET     Pt will have 5/5 bilateral knee extension strength to return to goals of stairs without pain.  Eval Status: 4/5 B  Current Status: 5/5 B on 2024, MET     Long Term Goals: To be accomplished in 8 weeks   Patient will improve LEFS score by 10 points in order to improve pain with stairs.               Eval Status: 65/80         Current Status: 74/80, progressing     2.   Pt will have 5/5 hip extension strength to return to goals of bending/lifting without pain.    Eval Status: 3+/5 left, 3/5 right   Current Status: 4/5 L, 4-/5  R on 8/14/2024, progressing    RECOMMENDATIONS  Discontinue therapy.      If you have any questions/comments please contact us directly at [phone].   Thank you for allowing us to assist in the care of your patient.        Therapist Signature: Roxanna Cerda PT Date: 8/22/2024   Reporting Period: 8/14/2024-8/22/2024 Time: 4:59 PM

## 2024-08-22 NOTE — THERAPY DISCHARGE
Physical Therapy Discharge Instructions      [facility] - [address]   [address]  [facility] [address] [phone] fax      Patient: Steffany Payne  : 1955      Continue Home Exercise Program 3 times per week      Continue with   [x] Ice  as needed    [] Heat           Follow up with MD:     [] Upon completion of therapy     [x] As needed      Recommendations:     [x]   Return to activity with home program    []   Return to activity with the following modifications:       []Post Rehab Program    []Join Independent aquatic program     []Return to/join local gym        Additional Comments: Follow any activity restrictions provided by cardiology; stop exercise and seek medical attention if any cardiac symptoms      Roxanna Cerda, PT 2024 4:57 PM

## 2024-08-22 NOTE — PROGRESS NOTES
goals.  (see flow sheet as applicable)     Details if applicable:  LEFS, mini-squat             26  [facility] Totals Reminder: bill using total billable min of TIMED therapeutic procedures (example: do not include dry needle or estim unattended, both untimed codes, in totals to left)  8-22 min = 1 unit; 23-37 min = 2 units; 38-52 min = 3 units; 53-67 min = 4 units; 68-82 min = 5 units   Total Total     [x]  Patient Education billed concurrently with other procedures   [x] Review HEP    [x] Progressed/Changed HEP, detail:    Access Code: [email or web address]  URL: [email or web address]  Date: 08/22/2024  Prepared by: Roxanna Cerda    Exercises  - Standing Hip Flexion with Counter Support  - 1 x daily - 7 x weekly - 1 sets - 10 reps - 3 second hold  - Standing Hip Abduction with Counter Support  - 1 x daily - 7 x weekly - 1 sets - 10 reps - 3 second hold  - Standing Hip Extension with Counter Support  - 1 x daily - 7 x weekly - 1 sets - 10 reps - 3 second hold  - Mini Squat with Counter Support  - 1 x daily - 7 x weekly - 1 sets - 10 reps - 3 second hold  [x] Other detail: Advised to hold supine/sidelying/prone exercise until following up with cardiology      Objective Information/Functional Measures/Assessment    Exercises per flow sheet    LEFS: 74/80    Progress toward goals / Updated goals:  [x]  See Discharge Note    Short Term Goals: To be accomplished in 4 weeks  Patient will be independent and compliant with HEP to progress toward goals and restore functional mobility.   Eval Status: issued at [facility]  Current Status: independent with HEP, MET     Pt will have 5/5 bilateral knee extension strength to return to goals of stairs without pain.  Eval Status: 4/5 B  Current Status: 5/5 B on 8/14/2024, MET    Long Term Goals: To be accomplished in 8 weeks   Patient will improve LEFS score by 10 points in order to improve pain with stairs.               Eval Status: 65/80         Current Status: 74/80,  progressing    2.   Pt will have 5/5 hip extension strength to return to goals of bending/lifting without pain.    Eval Status: 3+/5 left, 3/5 right   Current Status: 4/5 L, 4-/5 R on 8/14/2024, progressing    PLAN  No  Continue plan of care  []  Upgrade activities as tolerated  [x]  Discharge due to : I with HEP, requests discharge due to undergoing cardiac testing  []  Other:    Roxanna Cerda, TJ    8/22/2024    4:51 PM    Future Appointments   Date Time Provider Department Center   8/22/2024  5:20 PM Roxanna Cerda, PT [email or web address]       If an interpreting service was utilized for treatment of this patient, the contents of this document represent the material reviewed with the patient via the .